# Patient Record
Sex: MALE | Race: WHITE
[De-identification: names, ages, dates, MRNs, and addresses within clinical notes are randomized per-mention and may not be internally consistent; named-entity substitution may affect disease eponyms.]

---

## 2019-07-17 ENCOUNTER — HOSPITAL ENCOUNTER (EMERGENCY)
Dept: HOSPITAL 33 - ED | Age: 34
Discharge: HOME | End: 2019-07-17
Payer: SELF-PAY

## 2019-07-17 VITALS — SYSTOLIC BLOOD PRESSURE: 140 MMHG | DIASTOLIC BLOOD PRESSURE: 93 MMHG | OXYGEN SATURATION: 100 % | HEART RATE: 81 BPM

## 2019-07-17 DIAGNOSIS — R10.9: Primary | ICD-10-CM

## 2019-07-17 PROCEDURE — 99284 EMERGENCY DEPT VISIT MOD MDM: CPT

## 2019-07-17 PROCEDURE — 74176 CT ABD & PELVIS W/O CONTRAST: CPT

## 2019-07-17 PROCEDURE — 36000 PLACE NEEDLE IN VEIN: CPT

## 2019-07-17 NOTE — ERPHSYRPT
- History of Present Illness


Historian: patient


Exam Limitations: no limitations


Patient Subjective Stated Complaint: STATES HAS HAD MID ABD PAIN FOR THREE 

MONTHS EVERY TIME HE COUGHS.  YESTERDAY NOTICED A LUMP AROUND UMBILICUS WHEN HE 

COUGHS AND HAS SHARP PAIN.  NORMAL BM YESTERDAY.  STATES FEELS PRESSURE WHEN HE 

VOIDS.


Triage Nursing Assessment: ABD SOFT,TENDER MID ABD.  SLIGHT SWELLING AROUND 

UMBILICUS


Physician History: 





Pt is a 32 y/o male that presented to the ED with periumbilical discomfort.  Pt 

had an inguinal hernia repair on the L years ago, and now he feels a "knot" in 

the belly button and discomfort with coughing and sneezing.  Pt is worried 

because he has a mesh placed, and is wondering if it can travel to his abdomen 

and cause the pain.


Timing/Duration: yesterday


Activities at Onset: none


Quality: fullness


Abdominal Pain Onset Location: periumbilical


Pain Radiation: no radiation


Severity of Pain-Max: mild


Severity of Pain-Current: mild


Modifying Factors: Improves With: nothing


Associated Symptoms: denies symptoms


Previous symptoms: no prior history


Hx Tetanus, Diphtheria Vaccination/Date Given: Yes


Hx Influenza Vaccination/Date Given: No


Hx Pneumococcal Vaccination/Date Given: No





- Review of Systems


Constitutional: No Fever, No Chills


Eyes: No Symptoms


Ears, Nose, & Throat: No Symptoms


Respiratory: No Cough, No Dyspnea


Cardiac: No Chest Pain, No Edema, No Syncope


Abdominal/Gastrointestinal: Abdominal Pain


Genitourinary Symptoms: No Dysuria


Musculoskeletal: No Back Pain, No Neck Pain


Neurological: No Dizziness, No Focal Weakness, No Sensory Changes





- Past Medical History


Pertinent Past Medical History: No





- Past Surgical History


Past Surgical History: Yes


Gastrointestinal: Hernia Repair


Other Surgical History: FOOT SURGERY TO REMOVE FOREIGN BODY





- Social History


Smoking Status: Current every day smoker


Exposure to second hand smoke: Yes


Drug Use: none


Patient Lives Alone: No





- Nursing Vital Signs


Nursing Vital Signs: 





 Initial Vital Signs











Temperature  97.8 F   07/17/19 08:43


 


Pulse Rate  81   07/17/19 08:43


 


Respiratory Rate  16   07/17/19 08:43


 


Blood Pressure  140/93   07/17/19 08:43


 


O2 Sat by Pulse Oximetry  100   07/17/19 08:43








 Pain Scale











Pain Intensity                 7

















- Physical Exam


General Appearance: no apparent distress, alert


Eye Exam: PERRL/EOMI, eyes nml inspection


Ears, Nose, Throat Exam: normal ENT inspection, pharynx normal, moist mucous 

membranes


Neck Exam: normal inspection, non-tender, supple, full range of motion


Respiratory Exam: normal breath sounds, lungs clear, No respiratory distress


Cardiovascular Exam: regular rate/rhythm, normal heart sounds


Gastrointestinal/Abdomen Exam: soft, normal bowel sounds, other (scar tissue is 

palpated around the umbilicus, from previous trocar placement.)


Back Exam: normal inspection, normal range of motion, No CVA tenderness, No 

vertebral tenderness


Extremity Exam: normal inspection, normal range of motion, pelvis stable


Neurologic Exam: alert, oriented x 3, cooperative, normal mood/affect, nml 

cerebellar function, sensation nml, No motor deficits


**SpO2 Interpretation**: normal


SpO2: 100


O2 Delivery: Room Air





- CT Exams


  ** Abdomen/Pelvis


CT Interpretation: Negative (mild fecal stasis without obstruction.  Remaining 

CT is negative.)


Ordered Tests: 





 Active Orders 24 hr











 Category Date Time Status


 


 ABDOMEN AND PELVIS W/0 CONTRAS [CT] Stat Exams  07/17/19 08:57 Completed














- Progress


Progress: unchanged


Progress Note: 





07/17/19 09:42


Pt was seen and examined.  Pt has no other medical conditions.  He was benign 

on exam and only small scar tissue is palpated in umbilicus.  CT shows some 

fecal stasis with no other pathology.  Pt was reassured and is clear for d/c.


Will see patient in: office


Counseled pt/family regarding: need for follow-up





- Departure


Departure Disposition: Home


Clinical Impression: 


 Abdominal pain





Condition: Stable


Critical Care Time: No


Additional Instructions: 


F/U with PCP.

## 2019-07-17 NOTE — XRAY
Indication: Abdomen pain 3 weeks.  Difficulty urinating.



Multiple contiguous axial images obtained through the abdomen and pelvis

without contrast as ordered.



Comparison: None



Lung bases are clear.  Heart is not enlarged.



Noncontrasted stomach and bowel loops appear nonobstructed.  Normal appendix.

Mild scattered colonic fecal debris predominantly in the transverse and

descending colon.  No free fluid/air.  Remaining liver, gallbladder, pancreas,

spleen, adrenal glands, kidneys, ureters, bladder, and aorta appear

unremarkable for noncontrast exam.



Osseous structures intact.  No ventral or inguinal hernias.



Impression:

1.  Mild fecal stasis without obstruction.

2.  Remaining CT abdomen/pelvis without contrast exam is negative.



CTDI 8.39 normal...